# Patient Record
Sex: MALE | Race: WHITE | NOT HISPANIC OR LATINO | URBAN - METROPOLITAN AREA
[De-identification: names, ages, dates, MRNs, and addresses within clinical notes are randomized per-mention and may not be internally consistent; named-entity substitution may affect disease eponyms.]

---

## 2020-10-27 ENCOUNTER — EMERGENCY (EMERGENCY)
Facility: HOSPITAL | Age: 50
LOS: 1 days | Discharge: HOME | End: 2020-10-27
Admitting: EMERGENCY MEDICINE
Payer: SELF-PAY

## 2020-10-27 VITALS
DIASTOLIC BLOOD PRESSURE: 80 MMHG | SYSTOLIC BLOOD PRESSURE: 117 MMHG | RESPIRATION RATE: 17 BRPM | TEMPERATURE: 98 F | OXYGEN SATURATION: 93 % | HEART RATE: 85 BPM | HEIGHT: 74 IN | WEIGHT: 300.05 LBS

## 2020-10-27 DIAGNOSIS — R21 RASH AND OTHER NONSPECIFIC SKIN ERUPTION: ICD-10-CM

## 2020-10-27 DIAGNOSIS — B02.9 ZOSTER WITHOUT COMPLICATIONS: ICD-10-CM

## 2020-10-27 PROCEDURE — 99053 MED SERV 10PM-8AM 24 HR FAC: CPT

## 2020-10-27 PROCEDURE — 99283 EMERGENCY DEPT VISIT LOW MDM: CPT

## 2020-10-27 RX ORDER — VALACYCLOVIR 500 MG/1
1 TABLET, FILM COATED ORAL
Qty: 21 | Refills: 0
Start: 2020-10-27 | End: 2020-11-02

## 2020-10-27 RX ORDER — OXYCODONE AND ACETAMINOPHEN 5; 325 MG/1; MG/1
1 TABLET ORAL ONCE
Refills: 0 | Status: DISCONTINUED | OUTPATIENT
Start: 2020-10-27 | End: 2020-10-27

## 2020-10-27 RX ORDER — VALACYCLOVIR 500 MG/1
1000 TABLET, FILM COATED ORAL ONCE
Refills: 0 | Status: COMPLETED | OUTPATIENT
Start: 2020-10-27 | End: 2020-10-27

## 2020-10-27 RX ORDER — OXYCODONE AND ACETAMINOPHEN 5; 325 MG/1; MG/1
1 TABLET ORAL
Qty: 15 | Refills: 0
Start: 2020-10-27

## 2020-10-27 RX ADMIN — OXYCODONE AND ACETAMINOPHEN 1 TABLET(S): 5; 325 TABLET ORAL at 06:25

## 2020-10-27 RX ADMIN — VALACYCLOVIR 1000 MILLIGRAM(S): 500 TABLET, FILM COATED ORAL at 06:25

## 2020-10-27 NOTE — ED PROVIDER NOTE - OBJECTIVE STATEMENT
50 yo male with pmhx lupus on prednisone 20mg daily, august's, presents c/o rash to right abdomen x 2 days, states he was having pain described as burning to the area 2 days prior to onset of rash. no fever. not weeping anymore. no diarrhea/chills or vomiting. states he has been under a lot of stress. history of shingles twice in the past.

## 2020-10-27 NOTE — ED PROVIDER NOTE - PATIENT PORTAL LINK FT
You can access the FollowMyHealth Patient Portal offered by [facility] by registering at the following website: [email or web address]. By joining [facility]’s FollowMyHealth portal, you will also be able to view your health information using other applications (apps) compatible with our system.

## 2020-10-27 NOTE — ED PROVIDER NOTE - CLINICAL SUMMARY MEDICAL DECISION MAKING FREE TEXT BOX
zoster rash to abdomen, well appearing, NAD, rx valacyclovir, percocet, has ibuprofen at home, discussed precautions/post herpetic neuralgia, lives in [place], will be returning home in a few days, advised f/u pmd. patient agrees with plan

## 2020-10-27 NOTE — ED PROVIDER NOTE - NSFOLLOWUPINSTRUCTIONS_ED_ALL_ED_FT
Take medications as prescribed  Take Ibuprofen 800mg every 8 hours as needed for pain, take with food  In addition you may take Percocet 1 tab every 4-6 hours as needed for pain. Caution causes drowsiness/sleepiness and constipation, increase fiber intake. do not drive when taking percocet.    Avoid being around who are immunocompromised, extremes of ages or anyone who is sick until symptoms resolved.    Follow up with your doctor    Shingles is a painful rash. Shingles is caused by the same virus that causes chickenpox (varicella-zoster). After you get chickenpox, the virus stays in your body for several years without causing any symptoms. Shingles occurs when the virus becomes active again. The active virus travels along a nerve to your skin and causes a rash.    DISCHARGE INSTRUCTIONS:    Call your local emergency number (911 in the ) if:   •You have trouble moving your arms, legs, or face.      •You become confused, or have difficulty speaking.      •You have a seizure.      Return to the emergency department if:   •You have weakness in an arm or leg.      •You have dizziness, a severe headache, or hearing or vision loss.      •You have painful, red, warm skin around the blisters, or the blisters drain pus.      •Your neck is stiff or you have trouble moving it.      Call your doctor if:   •You feel weak or have a headache.      •You have a cough, chills, or a fever.      •You have abdominal pain or nausea, or you are vomiting.      •Your rash becomes more itchy or painful.      •Your rash spreads to other parts of your body.      •Your pain worsens and does not go away even after you take medicine.      •You have questions or concerns about your condition or care.      Medicines: You may need any of the following:  •Antiviral medicine helps decrease symptoms and healing time. They may also decrease your risk of developing nerve pain. You will need to start taking them within 3 days of the start of symptoms to prevent nerve pain.      •Prescription pain medicine may be given. Ask your healthcare provider how to take this medicine safely. Some prescription pain medicines contain acetaminophen. Do not take other medicines that contain acetaminophen without talking to your healthcare provider. Too much acetaminophen may cause liver damage. Prescription pain medicine may cause constipation. Ask your healthcare provider how to prevent or treat constipation.       •Acetaminophen decreases pain and fever. It is available without a doctor's order. Ask how much to take and how often to take it. Follow directions. Read the labels of all other medicines you are using to see if they also contain acetaminophen, or ask your doctor or pharmacist. Acetaminophen can cause liver damage if not taken correctly. Do not use more than 4 grams (4,000 milligrams) total of acetaminophen in one day.       •NSAIDs, such as ibuprofen, help decrease swelling, pain, and fever. This medicine is available with or without a doctor's order. NSAIDs can cause stomach bleeding or kidney problems in certain people. If you take blood thinner medicine, always ask if NSAIDs are safe for you. Always read the medicine label and follow directions. Do not give these medicines to children under 6 months of age without direction from your child's healthcare provider.      •Topical anesthetics are used to numb the skin and decrease pain. They can be a cream, gel, spray, or patch.       •Anticonvulsants decrease nerve pain and may help you sleep at night.      •Antidepressants may be used to decrease nerve pain.      •Take your medicine as directed. Contact your healthcare provider if you think your medicine is not helping or if you have side effects. Tell him of her if you are allergic to any medicine. Keep a list of the medicines, vitamins, and herbs you take. Include the amounts, and when and why you take them. Bring the list or the pill bottles to follow-up visits. Carry your medicine list with you in case of an emergency.      Self-care: Keep your rash clean and dry. Cover your rash with a bandage or clothing. Do not use bandages that stick to your skin. The sticky part may irritate your skin and make your rash last longer.    Prevent the spread of germs:          •Wash your hands often. Wash your hands several times each day. Wash after you use the bathroom, change a child's diaper, and before you prepare or eat food. Use soap and water every time. Rub your soapy hands together, lacing your fingers. Wash the front and back of your hands, and in between your fingers. Use the fingers of one hand to scrub under the fingernails of the other hand. Wash for at least 20 seconds. Rinse with warm, running water for several seconds. Then dry your hands with a clean towel or paper towel. Use hand  that contains alcohol if soap and water are not available. Do not touch your eyes, nose, or mouth without washing your hands first.  Handwashing           •Cover a sneeze or cough. Use a tissue that covers your mouth and nose. Throw the tissue away in a trash can right away. Use the bend of your arm if a tissue is not available. Wash your hands well with soap and water or use a hand .      •Stay away from others while you are sick. Avoid crowds as much as possible.      •Ask about vaccines you may need. Talk to your healthcare provider about your vaccine history. He or she will tell you which vaccines you need, and when to get them.      Prevent shingles or another shingles outbreak: A vaccine may be given to help prevent shingles. You can get the vaccine even if you already had shingles. The vaccine can help prevent a future outbreak. If you do get shingles again, the vaccine can keep it from becoming severe. The vaccine comes in 2 forms. Your healthcare provider will tell you which form is right for you. The decision is based on your age and any medical conditions you have. A 2-dose vaccine is usually given to adults 50 years or older. A 1-dose vaccine may be given to adults 60 years or older.

## 2020-10-27 NOTE — ED PROVIDER NOTE - PHYSICAL EXAMINATION
CONSTITUTIONAL: Well-appearing; well-nourished; in no apparent distress.   	HEAD: Normocephalic; atraumatic.   	EYES:  conjunctiva and sclera clear  	ENT: airway patent  	NECK: Supple; non-tender;   	CARDIOVASCULAR: Normal S1, S2; no murmurs, rubs, or gallops. Regular rate and rhythm.   	RESPIRATORY: Breathing easily; breath sounds clear and equal bilaterally; no wheezes, rhonchi, or rales.  	GI: Soft; non-distended; non-tender  	EXT: No cyanosis or edema; N/V intact  	SKIN: RUQ abdomen rash dried erythematous macules on erythematous base follows dermatome, does not cross midline.   	NEURO: A & O x 3; face symmetric; grossly unremarkable.   PSYCHOLOGICAL: The patient’s mood and manner are appropriate.

## 2020-10-27 NOTE — ED ADULT TRIAGE NOTE - CHIEF COMPLAINT QUOTE
C/o rash to right side abdomen that appeared yesterday. pain to area for 1 week.  red scabs noted to lt abd, pt reports clear fluid leaking today pain 8/10 described as burning. PMH Lupus/Raynauds syndrome